# Patient Record
Sex: MALE | Race: WHITE | Employment: UNEMPLOYED | URBAN - METROPOLITAN AREA
[De-identification: names, ages, dates, MRNs, and addresses within clinical notes are randomized per-mention and may not be internally consistent; named-entity substitution may affect disease eponyms.]

---

## 2017-09-09 ENCOUNTER — HOSPITAL ENCOUNTER (EMERGENCY)
Age: 22
Discharge: HOME | End: 2017-09-09
Attending: EMERGENCY MEDICINE | Admitting: EMERGENCY MEDICINE
Payer: COMMERCIAL

## 2017-09-09 VITALS
OXYGEN SATURATION: 97 % | DIASTOLIC BLOOD PRESSURE: 55 MMHG | WEIGHT: 175.71 LBS | SYSTOLIC BLOOD PRESSURE: 117 MMHG | RESPIRATION RATE: 16 BRPM | HEART RATE: 58 BPM | TEMPERATURE: 97.9 F

## 2017-09-09 DIAGNOSIS — L08.9 SKIN INFECTION: Primary | ICD-10-CM

## 2017-09-09 PROCEDURE — 99282 EMERGENCY DEPT VISIT SF MDM: CPT

## 2017-09-09 RX ORDER — CEPHALEXIN 500 MG/1
500 CAPSULE ORAL 4 TIMES DAILY
Qty: 20 CAP | Refills: 0 | Status: SHIPPED | OUTPATIENT
Start: 2017-09-09 | End: 2017-09-14

## 2017-09-09 RX ORDER — BACITRACIN 500 [USP'U]/G
OINTMENT TOPICAL 3 TIMES DAILY
Qty: 1 TUBE | Refills: 0 | Status: SHIPPED | OUTPATIENT
Start: 2017-09-09 | End: 2017-09-19

## 2017-09-09 RX ORDER — SULFAMETHOXAZOLE AND TRIMETHOPRIM 800; 160 MG/1; MG/1
1 TABLET ORAL 2 TIMES DAILY
Qty: 5 TAB | Refills: 0 | Status: SHIPPED | OUTPATIENT
Start: 2017-09-09 | End: 2017-09-19

## 2017-09-09 NOTE — DISCHARGE INSTRUCTIONS
Cellulitis: After Your Visit to the Emergency Room  Your Care Instructions  Cellulitis is a skin infection that can spread to other tissues in the body. It can lead to a serious infection of the blood. It is important to follow instructions for home treatment and follow-up care so that you recover completely. Even though you have been released from the emergency room, you still need to watch for any problems. The doctor carefully checked you. But sometimes problems can develop later. If you have new symptoms, or if your symptoms do not get better, return to the emergency room or call your doctor right away. A visit to the emergency room is only one step in your treatment. Even if you feel better, you still need to do what your doctor recommends, such as going to all suggested follow-up appointments and taking medicines exactly as directed. This will help you recover and help prevent future problems. How can you care for yourself at home? · You may need antibiotics that you take through a tube in your arm (IV) at home. Your doctor will tell you how to use this medicine. · If you are taking antibiotic pills, take them as directed. Do not stop taking them just because you feel better. You need to take the full course of antibiotics. · Prop up the infected area on pillows to reduce pain and swelling. Try to keep the area above the level of your heart as much as you can. · Keep the skin clean and dry. Change your bandages as your doctor has told you. · Take pain medicines exactly as directed. ¨ If the doctor gave you a prescription medicine for pain, take it as prescribed. ¨ If you are not taking a prescription pain medicine, ask your doctor if you can take an over-the-counter medicine. · If your doctor told you to use support stockings, wear them as directed. This will help prevent swelling. · Get plenty of rest.  · Follow up with your doctor to make sure that the infection is gone.   When should you call for help? Call 911 if:  · You passed out (lost consciousness). · You have other symptoms that you think are a medical emergency. Return to the emergency room now if:  · You are dizzy or lightheaded, or you feel like you may faint. · You have signs that the infection is getting worse, such as:  ¨ Increased pain, swelling, warmth, or redness. ¨ New or increasing red streaks leading from the skin infection. ¨ New or increasing pus draining from the skin infection. ¨ A new or higher fever. Call your doctor today if:  · You have problems with your medicine. Where can you learn more? Go to GiveGab.be  Enter A262 in the search box to learn more about \"Cellulitis: After Your Visit to the Emergency Room. \"   © 9260-9001 [employer]. Care instructions adapted under license by [facility] [employer] [facility] (which disclaims liability or warranty for this information). This care instruction is for use with your licensed healthcare professional. If you have questions about a medical condition or this instruction, always ask your healthcare professional. [place] [address] any warranty or liability for your use of this information. Content Version: 9.4.97069;  Last Revised: October 24, 2011

## 2017-09-09 NOTE — ED TRIAGE NOTES
\"Rug burn\" on bilateral knees and now the right knee has an area of what appears to be a fluid filled blister. No medications PTA. No fever.

## 2017-09-09 NOTE — ED PROVIDER NOTES
Patient is a 23 y.o. male presenting with skin problem. Skin Problem           24y M here with a blister on the R knee. About a week ago he fell on a carpeted surface and got what he describes as \"rug burn\" to both knees and scabs had formed. Over the past few days he noticed a blister under the scab on the R knee. The area in general is itchy. There is some redness surrounding the area. It hurts more when he walks or bends his knee. No fever. No vomiting. No hx of similar problems. No other rash. History reviewed. No pertinent past medical history. History reviewed. No pertinent surgical history. History reviewed. No pertinent family history. Social History     Social History    Marital status: N/A     Spouse name: N/A    Number of children: N/A    Years of education: N/A     Occupational History    Not on file. Social History Main Topics    Smoking status: Current Every Day Smoker    Smokeless tobacco: Never Used    Alcohol use Not on file    Drug use: Not on file    Sexual activity: Not on file     Other Topics Concern    Not on file     Social History Narrative    No narrative on file         ALLERGIES: Review of patient's allergies indicates no known allergies. Review of Systems   Review of Systems   Constitutional: (-) weight loss. HEENT: (-) stiff neck   Eyes: (-) discharge. Respiratory: (-) for cough. Cardiovascular: (-) syncope. Gastrointestinal: (-) blood in stool. Genitourinary: (-) hematuria. Musculoskeletal: (-) myalgias. Neurological: (-) seizure. Skin: (-) petechiae  Lymph/Immunologic: (-) enlarged lymph nodes  All other systems reviewed and are negative. Vitals:    09/09/17 0939 09/09/17 0941   BP:  117/55   Pulse:  (!) 58   Resp:  16   Temp:  97.9 °F (36.6 °C)   SpO2:  97%   Weight: 79.7 kg (175 lb 11.3 oz)             Physical Exam Nursing note and vitals reviewed. Constitutional: oriented to person, place, and time.  appears well-developed and well-nourished. No distress. Head: Normocephalic and atraumatic. Nose: No rhinorrhea  Mouth/Throat: Oropharynx is clear and moist.  Eyes: Conjunctivae are normal.  Neck: Painless normal range of motion. Cardiovascular: Normal rate  Pulmonary/Chest:  No respiratory distress  Extremities/Musculoskeletal: Normal range of motion. No tenderness. No edema. Distal extremities are neurovasc intact. Scabs over both knees. R knee with a blister at the superior portion of the scab. It is not warm. Slightly tender to touch. Small area of redness surrounding the scab. Fluid in the blister appears clear to slightly red. Neurological:  Alert and oriented to person, place, and time. Coordination normal. CN 2-12 intact. Motor and sensory function intact. Skin: Skin is warm and dry. No rash noted. No pallor. MDM 24y M here with a blister under a scab. Looks like he may have an infection. Will treat with topical antibiotic ointment and oral abx. No signs of joint infection. Can move the knee freely.      ED Course       Procedures

## 2018-02-21 ENCOUNTER — HOSPITAL ENCOUNTER (EMERGENCY)
Age: 23
Discharge: HOME | End: 2018-02-22
Attending: EMERGENCY MEDICINE
Payer: COMMERCIAL

## 2018-02-21 DIAGNOSIS — F41.1 ANXIETY STATE: Primary | ICD-10-CM

## 2018-02-21 PROCEDURE — 90791 PSYCH DIAGNOSTIC EVALUATION: CPT

## 2018-02-21 PROCEDURE — 99283 EMERGENCY DEPT VISIT LOW MDM: CPT

## 2018-02-22 VITALS
HEART RATE: 66 BPM | DIASTOLIC BLOOD PRESSURE: 54 MMHG | HEIGHT: 74 IN | BODY MASS INDEX: 23.1 KG/M2 | OXYGEN SATURATION: 96 % | TEMPERATURE: 98.3 F | SYSTOLIC BLOOD PRESSURE: 95 MMHG | RESPIRATION RATE: 15 BRPM | WEIGHT: 180 LBS

## 2018-02-22 PROCEDURE — 74011250637 HC RX REV CODE- 250/637: Performed by: EMERGENCY MEDICINE

## 2018-02-22 RX ORDER — ALPRAZOLAM 0.5 MG/1
TABLET ORAL
Status: DISCONTINUED
Start: 2018-02-22 | End: 2018-02-22 | Stop reason: HOSPADM

## 2018-02-22 RX ORDER — ALPRAZOLAM 0.5 MG/1
0.5 TABLET ORAL
Status: COMPLETED | OUTPATIENT
Start: 2018-02-22 | End: 2018-02-22

## 2018-02-22 RX ORDER — ALPRAZOLAM 0.5 MG/1
0.5 TABLET ORAL
Qty: 8 TAB | Refills: 0 | Status: SHIPPED | OUTPATIENT
Start: 2018-02-22 | End: 2018-06-22

## 2018-02-22 RX ADMIN — ALPRAZOLAM 0.5 MG: 0.5 TABLET ORAL at 01:24

## 2018-02-22 NOTE — ED PROVIDER NOTES
Patient is a 23 y.o. male presenting with anxiety. Anxiety    The problem has been gradually worsening. Associated symptoms include nausea and vomiting. Pertinent negatives include no cough, no fever and no headaches. 23year old male with without significant pmhx presents with generalized anxiety- can't sleep, isn't eating, having a hard time getting to work. Worse over the past week. Has never seen anyone for it. Thinks his mom may have anxiety, no other mental health issues he knows of. Denies suicidal/homicdal thoughts. Does not feel he needs admission. History reviewed. No pertinent past medical history. History reviewed. No pertinent surgical history. History reviewed. No pertinent family history. Social History     Social History    Marital status: SINGLE     Spouse name: N/A    Number of children: N/A    Years of education: N/A     Occupational History    Not on file. Social History Main Topics    Smoking status: Current Every Day Smoker    Smokeless tobacco: Never Used    Alcohol use Not on file    Drug use: Not on file    Sexual activity: Not on file     Other Topics Concern    Not on file     Social History Narrative         ALLERGIES: Review of patient's allergies indicates no known allergies. Review of Systems   Constitutional: Negative for fever. HENT: Negative for congestion. Eyes: Negative for visual disturbance. Respiratory: Negative for cough. Gastrointestinal: Positive for nausea and vomiting. Negative for abdominal distention. Endocrine: Negative for polyuria. Genitourinary: Negative for dysuria. Musculoskeletal: Negative for gait problem. Skin: Negative for rash. Neurological: Negative for headaches. Psychiatric/Behavioral: Positive for dysphoric mood and sleep disturbance. Negative for hallucinations, self-injury and suicidal ideas. The patient is nervous/anxious.         Vitals:    02/21/18 2350   BP: 120/70   Pulse: 70   Resp: 16 Temp: 98.4 °F (36.9 °C)   SpO2: 97%   Weight: 81.6 kg (180 lb)   Height: 6' 2\" (1.88 m)            Physical Exam   Constitutional: He is oriented to person, place, and time. He appears well-developed and well-nourished. No distress. HENT:   Head: Normocephalic and atraumatic. Mouth/Throat: Oropharynx is clear and moist. No oropharyngeal exudate. Eyes: Conjunctivae and EOM are normal. Pupils are equal, round, and reactive to light. Right eye exhibits no discharge. Left eye exhibits no discharge. No scleral icterus. Neck: Normal range of motion. Neck supple. No JVD present. Cardiovascular: Normal rate, regular rhythm, normal heart sounds and intact distal pulses. Exam reveals no gallop and no friction rub. No murmur heard. Pulmonary/Chest: Effort normal and breath sounds normal. No stridor. No respiratory distress. He has no wheezes. He has no rales. He exhibits no tenderness. Abdominal: Soft. Bowel sounds are normal. He exhibits no distension and no mass. There is no tenderness. There is no rebound and no guarding. Musculoskeletal: Normal range of motion. He exhibits no edema or tenderness. Neurological: He is alert and oriented to person, place, and time. He has normal reflexes. No cranial nerve deficit. He exhibits normal muscle tone. Coordination normal.   Skin: Skin is warm and dry. No rash noted. No erythema. Psychiatric: He has a normal mood and affect. His behavior is normal. Judgment and thought content normal.        [facility]      ED Course       Procedures      [facility] eval- does not feel he needs admission and patient is not seeking admission. Patient asking for medications to help with his symptoms. Will given #8 0.5mg xanax and referral info for outpatient therapy. Will return if symptoms worsen.

## 2018-02-22 NOTE — DISCHARGE INSTRUCTIONS

## 2018-02-22 NOTE — ED TRIAGE NOTES
Patient presents to the emergency department reporting anxiety. Patient was seen by his PCP for the same and referred to psychiatry. Patient denies hallucinations, delusions, SI or HI. Patient states his anxiety has become unbearable.

## 2018-02-22 NOTE — BSMART NOTE
Patient is 23year old male reporting to ED Patient presents to the emergency department reporting anxiety. Patient was seen by his PCP for the same and referred to psychiatry. Patient denies hallucinations, delusions, SI or HI. Patient states his anxiety has become unbearable. At bedside, patient denied suicidal thoughts, homicidal and hallucinations. Patient denied specific stressors reported daily life but not to point out. Patient reported he lives with his mother and works in which both are going well. Patient reported that he smokes marijuana daily and it helps sometimes with his anxiety. Patient has not been previously hospitalized and has not seen as psychiatrist in the past. The patient was observed lying in bed with his girlfriend beside him and he appear calm and he was cooperative. Patient does not meet criteria for an admission and he is not seeking an admission at this time.  Patient will be given resources for psychiatrist.

## 2018-02-22 NOTE — ED NOTES
Pt discharged from ED with prescription and community resources. VSS. NAD. Pt ambulatory from ED with steady gait and  present for transportation.

## 2018-06-22 ENCOUNTER — APPOINTMENT (OUTPATIENT)
Dept: CT IMAGING | Age: 23
End: 2018-06-22
Attending: EMERGENCY MEDICINE
Payer: COMMERCIAL

## 2018-06-22 ENCOUNTER — HOSPITAL ENCOUNTER (EMERGENCY)
Age: 23
Discharge: HOME | End: 2018-06-22
Attending: PEDIATRICS
Payer: COMMERCIAL

## 2018-06-22 VITALS
SYSTOLIC BLOOD PRESSURE: 121 MMHG | DIASTOLIC BLOOD PRESSURE: 77 MMHG | BODY MASS INDEX: 22.39 KG/M2 | TEMPERATURE: 98.9 F | WEIGHT: 174.38 LBS | OXYGEN SATURATION: 100 % | HEART RATE: 73 BPM | RESPIRATION RATE: 18 BRPM

## 2018-06-22 DIAGNOSIS — S02.32XA CLOSED FRACTURE OF LEFT ORBITAL FLOOR, INITIAL ENCOUNTER (HCC): Primary | ICD-10-CM

## 2018-06-22 PROCEDURE — 74011250637 HC RX REV CODE- 250/637: Performed by: EMERGENCY MEDICINE

## 2018-06-22 PROCEDURE — 70486 CT MAXILLOFACIAL W/O DYE: CPT

## 2018-06-22 PROCEDURE — 74011000250 HC RX REV CODE- 250: Performed by: EMERGENCY MEDICINE

## 2018-06-22 PROCEDURE — 70450 CT HEAD/BRAIN W/O DYE: CPT

## 2018-06-22 PROCEDURE — 99284 EMERGENCY DEPT VISIT MOD MDM: CPT

## 2018-06-22 RX ORDER — ONDANSETRON 4 MG/1
4 TABLET, ORALLY DISINTEGRATING ORAL
Qty: 6 TAB | Refills: 0 | Status: ON HOLD | OUTPATIENT
Start: 2018-06-22 | End: 2020-02-25

## 2018-06-22 RX ORDER — AMOXICILLIN AND CLAVULANATE POTASSIUM 875; 125 MG/1; MG/1
1 TABLET, FILM COATED ORAL
Status: COMPLETED | OUTPATIENT
Start: 2018-06-22 | End: 2018-06-22

## 2018-06-22 RX ORDER — HYDROCODONE BITARTRATE AND ACETAMINOPHEN 5; 325 MG/1; MG/1
1 TABLET ORAL
Qty: 9 TAB | Refills: 0 | Status: ON HOLD | OUTPATIENT
Start: 2018-06-22 | End: 2020-02-25

## 2018-06-22 RX ORDER — ONDANSETRON 4 MG/1
4 TABLET, ORALLY DISINTEGRATING ORAL
Status: COMPLETED | OUTPATIENT
Start: 2018-06-22 | End: 2018-06-22

## 2018-06-22 RX ORDER — HYDROCODONE BITARTRATE AND ACETAMINOPHEN 5; 325 MG/1; MG/1
1 TABLET ORAL
Status: COMPLETED | OUTPATIENT
Start: 2018-06-22 | End: 2018-06-22

## 2018-06-22 RX ORDER — AMOXICILLIN AND CLAVULANATE POTASSIUM 875; 125 MG/1; MG/1
1 TABLET, FILM COATED ORAL 2 TIMES DAILY
Qty: 10 TAB | Refills: 0 | Status: SHIPPED | OUTPATIENT
Start: 2018-06-22 | End: 2018-06-27

## 2018-06-22 RX ADMIN — ONDANSETRON 4 MG: 4 TABLET, ORALLY DISINTEGRATING ORAL at 21:24

## 2018-06-22 RX ADMIN — HYDROCODONE BITARTRATE AND ACETAMINOPHEN 1 TABLET: 5; 325 TABLET ORAL at 22:04

## 2018-06-22 RX ADMIN — AMOXICILLIN AND CLAVULANATE POTASSIUM 1 TABLET: 875; 125 TABLET, FILM COATED ORAL at 22:32

## 2018-06-22 RX ADMIN — FLUORESCEIN SODIUM 1 STRIP: 0.6 STRIP OPHTHALMIC at 21:49

## 2018-06-22 NOTE — Clinical Note
Norco:1 pill every 6 hours as needed for pain. Be aware of sedating effects, no alcohol or driving with this medicine. Zofran:1 pill every 8 hours as needed for nausea. Augmentin:1 pill every 12 hours for 5 days. Keep head elevated for next 48 hours. Place ice in a bag and apply to eye for 20 minutes 2-3 times a day for next 48 hours. Return to ER for any redness, warmth, increased swelling, pain, vomiting,  fever over 101.

## 2018-06-23 NOTE — ED NOTES
Patient education given on Norco, potential side effects, and not to take any additional acetaminophen and the patient expresses understanding and acceptance of instructions.  Mj Sena RN 6/22/2018 10:18 PM

## 2018-06-23 NOTE — ED PROVIDER NOTES
HPI Comments: 61-year-old male presents to the emergency room for evaluation left eye injury. Patient was playing basketball yesterday with left eye. Patient reports swelling to the left eye and redness to the left eye. Yesterday patient was only open his eye. Today the swelling has gone down the patient is able to open it. No blurry vision or double vision. No foreign body sensation. No tearing. No photophobia. No pain with movement of the eye. There was no loss of consciousness. No neck pain. Patient reports intermittent nausea. No numbness/tingling of upper or lower extremity. He took Tylenol with no relief. No dizziness or lightheadedness. Patient complains of pain around eye as well as bruising. Patient was seen in urgent care. Patient had fluorescein exam done with no dye uptake or abrasions. Patient was discharged with Toradol and told to come to the emergency room for further evaluation of the bones. Pain rated 7/10. Social hx  +smoker  Immunization up to date    Patient is a 23 y.o. male presenting with eye pain. The history is provided by the patient. Eye Pain    Associated symptoms include eye redness, nausea and pain. Pertinent negatives include no numbness, no discharge, no photophobia, no vomiting, no weakness and no dizziness. History reviewed. No pertinent past medical history. History reviewed. No pertinent surgical history. History reviewed. No pertinent family history. Social History     Social History    Marital status: SINGLE     Spouse name: N/A    Number of children: N/A    Years of education: N/A     Occupational History    Not on file.      Social History Main Topics    Smoking status: Current Every Day Smoker    Smokeless tobacco: Never Used    Alcohol use Not on file    Drug use: Not on file    Sexual activity: Not on file     Other Topics Concern    Not on file     Social History Narrative         ALLERGIES: Review of patient's allergies indicates no known allergies. Review of Systems   Constitutional: Negative for chills. HENT: Negative for congestion and sore throat. Eyes: Positive for pain and redness. Negative for photophobia, discharge, itching and visual disturbance. Respiratory: Negative for shortness of breath. Cardiovascular: Negative for chest pain. Gastrointestinal: Positive for nausea. Negative for abdominal pain and vomiting. Musculoskeletal: Negative for back pain, neck pain and neck stiffness. Skin: Positive for color change (black eye). Negative for wound. Neurological: Negative for dizziness, weakness, light-headedness, numbness and headaches. Vitals:    06/22/18 2100   BP: 121/77   Pulse: 73   Resp: 18   Temp: 98.9 °F (37.2 °C)   SpO2: 100%   Weight: 79.1 kg (174 lb 6.1 oz)            Physical Exam   Constitutional: He appears well-developed and well-nourished. No distress. HENT:   Head: Normocephalic. Eyes: EOM are normal. Pupils are equal, round, and reactive to light. Right eye exhibits no chemosis, no discharge, no exudate and no hordeolum. No foreign body present in the right eye. Left eye exhibits no chemosis, no discharge, no exudate and no hordeolum. No foreign body present in the left eye. Right conjunctiva is not injected. Right conjunctiva has no hemorrhage. Left conjunctiva is not injected. Left conjunctiva has a hemorrhage. No scleral icterus. Right eye exhibits normal extraocular motion and no nystagmus. Left eye exhibits normal extraocular motion and no nystagmus. Right pupil is round and reactive. Left pupil is round. Pupils are equal.   Slit lamp exam:       The right eye shows no hyphema, no hypopyon and no anterior chamber bulge. The left eye shows no hyphema, no hypopyon and no anterior chamber bulge. Leftr Eye:  Pupil equal and reactive. EOM fully intact and painless.  Pt able to look up and down, left and right  Soft tissue swelling and ecchymosis to eyelids and around left eye.  Pt tender to palpation along orbit. No step of. .  no warmth. No redness  No dye uptake. No abrasions noted on woods lamp exam.  Anterior chamber,clear  No hyphema. No foreign body noted. No dye uptake. No corneal abrasios  Subconjunctival hemorrhage noted. Visual acuity corrected: L:20/30, Right:20/70. Neck: Normal range of motion. Neck supple. No midline tenderness to palpation of cspine   Cardiovascular: Normal rate and regular rhythm. Pulmonary/Chest: Effort normal and breath sounds normal. No respiratory distress. Musculoskeletal: Normal range of motion. Neurological: He is alert. He exhibits normal muscle tone. Coordination normal.   Skin: Skin is warm and dry. No rash noted. Psychiatric: He has a normal mood and affect. His behavior is normal. Judgment and thought content normal.   Nursing note and vitals reviewed. MDM  Number of Diagnoses or Management Options  Closed fracture of left orbital floor, initial encounter [facility]):   Diagnosis management comments: 73-year-old male presenting for left eye injury. Patient has ecchymosis and swelling around the left eye. The eye is open. There is no hyphema. His extraocular movements are intact fully. There is no pain with movement of his eyes. His anterior chamber is clear. There is a subconjunctival hemorrhage to the medial aspect of the eye. He has mild bony tenderness around the orbit. No midline tenderness to the spine. He is alert and oriented. Plan: Visual acuity, restain for abrasion, Zofran, CAT scan of the orbits and head. 10:10 PM.  Pt case including HPI, PE, and all available lab and radiology results has been discussed with ENT Dr. Micah Ma. He recommends antibiotics, follow-up with him or ophthalmology. 10:18 PM  Pt case including HPI, PE, and all available lab and radiology results has been discussed with Dr. Ed Quiles. He is in agreement with antibiotics.  He will see patient on Sunday at 1 pm at [place]. This has been discussed with patient. He is in agreement with plan. Standard narcotic and sedating medication warnings given  Patient's results have been reviewed with them. Patient and/or family have verbally conveyed their understanding and agreement of the patient's signs, symptoms, diagnosis, treatment and prognosis and additionally agree to follow up as recommended or return to the Emergency Room should their condition change prior to follow-up. Discharge instructions have also been provided to the patient with some educational information regarding their diagnosis as well a list of reasons why they would want to return to the ER prior to their follow-up appointment should their condition change. Amount and/or Complexity of Data Reviewed  Discuss the patient with other providers: yes (ER attending-[place])    Patient Progress  Patient progress: stable        ED Course       Procedures    Pt has been seen and evaluated by attending physician who has reviewed lab work and imaging tests. She agrees with discharge and prescription plan.

## 2018-06-23 NOTE — ED TRIAGE NOTES
Triage Note: elbowed to the left eye yesterday during basketball. Bloody nose as well. Today swollen shut. Now red at the inner scleral area, some watering but less swollen now. No other injury and some nausea still, but less, tolerating food.   Went to [facility] sent here for further evaluation

## 2018-06-23 NOTE — ED NOTES
Patient education given on eye care, head elevation, follow up on Sunday and the patient expresses understanding and acceptance of instructions.  Xiomara Hernandez RN 6/22/2018 10:38 PM

## 2018-06-23 NOTE — DISCHARGE INSTRUCTIONS
Facial Fracture: Care Instructions  Your Care Instructions  You have broken (fractured) one or more bones in your face. Swelling and bruising from the injury are likely to get worse over the first couple of days. After that, the swelling should steadily improve until it is gone. If you have bruises on your face, they may change as they heal. The skin may turn from black and blue to green to yellow or brown before it returns to its normal color. It may take several weeks for your injury to heal.  It is very important that you get follow-up care as directed so that the injury heals properly and does not lead to problems. The kind of care and treatment you will need depends on the specific type of break (or breaks) you have. You heal best when you take good care of yourself. Eat a variety of healthy foods, and don't smoke. Follow-up care is a key part of your treatment and safety. Be sure to make and go to all appointments, and call your doctor if you are having problems. It's also a good idea to know your test results and keep a list of the medicines you take. How can you care for yourself at home? · Put ice or a cold pack on your injury for 10 to 20 minutes at a time. Try to do this every 1 to 2 hours for the next 3 days (when you are awake) or until the swelling goes down. Put a thin cloth between the ice pack and your skin. · Go to all follow-up appointments with your doctor. Your doctor will determine whether you need further treatment, including surgery. · Take your medicines exactly as prescribed. Call your doctor if you think you are having a problem with your medicine. You will get more details on the specific medicines your doctor prescribes. · If your doctor prescribed antibiotics, take them exactly as directed. Do not stop taking them just because you feel better. You need to take the full course of antibiotics. · Be safe with medicines. Read and follow all instructions on the label.   ¨ If the doctor gave you a prescription medicine for pain, take it as prescribed. ¨ If you are not taking a prescription pain medicine, ask your doctor if you can take an over-the-counter medicine. · Keep your head elevated when you sleep. · Eat soft food to decrease jaw pain. · Do not blow your nose. Dab it with a tissue if you need to. When should you call for help? Call 911 anytime you think you may need emergency care. For example, call if:  ? · You have a seizure. ? · You passed out (lost consciousness). ? · You have tingling, weakness, or numbness on one side of your body. ?Call your doctor now or seek immediate medical care if:  ? · You have a severe headache. ? · You develop double vision. ? · You have a fever and stiff neck. ? · Clear, watery fluid drains from your nose. ? · You feel dizzy or lightheaded. ? · You have new eye pain or changes in your vision, such as blurring. ? · You have new ear pain, ringing in your ears, or trouble hearing. ? · You are confused, irritable, or not acting normally. ? · You have a hard time standing, walking, or talking. ? · You have new mouth or tooth pain, or you have trouble chewing. ? · You have increasing pain even after you have taken your pain medicine. ? Watch closely for changes in your health, and be sure to contact your doctor if:  ? · You develop a cough, cold, or sinus infection. ? · The symptoms from your injury are not steadily improving. Where can you learn more? Go to http://rigoberto-zoya.info/. Enter [id number] [id number] in the search box to learn more about \"Facial Fracture: Care Instructions. \"  Current as of: October 14, 2016  Content Version: 11.4  © 7431-8373 [employer]. Care instructions adapted under license by [employer] (which disclaims liability or warranty for this information).  If you have questions about a medical condition or this instruction, always ask your healthcare professional. [employer] disclaims any warranty or liability for your use of this information.

## 2020-02-25 ENCOUNTER — HOSPITAL ENCOUNTER (INPATIENT)
Age: 25
LOS: 2 days | Discharge: HOME | DRG: 751 | End: 2020-02-27
Attending: PSYCHIATRY & NEUROLOGY | Admitting: PSYCHIATRY & NEUROLOGY
Payer: MEDICAID

## 2020-02-25 PROBLEM — F39 UNSPECIFIED MOOD (AFFECTIVE) DISORDER (HCC): Status: ACTIVE | Noted: 2020-02-25

## 2020-02-25 PROCEDURE — 65220000003 HC RM SEMIPRIVATE PSYCH

## 2020-02-25 PROCEDURE — 74011250637 HC RX REV CODE- 250/637: Performed by: NURSE PRACTITIONER

## 2020-02-25 PROCEDURE — 74011250637 HC RX REV CODE- 250/637: Performed by: PSYCHIATRY & NEUROLOGY

## 2020-02-25 RX ORDER — ADHESIVE BANDAGE
30 BANDAGE TOPICAL DAILY PRN
Status: DISCONTINUED | OUTPATIENT
Start: 2020-02-25 | End: 2020-02-27 | Stop reason: HOSPADM

## 2020-02-25 RX ORDER — OLANZAPINE 5 MG/1
5 TABLET ORAL
Status: DISCONTINUED | OUTPATIENT
Start: 2020-02-25 | End: 2020-02-27 | Stop reason: HOSPADM

## 2020-02-25 RX ORDER — HYDROXYZINE 25 MG/1
50 TABLET, FILM COATED ORAL
Status: DISCONTINUED | OUTPATIENT
Start: 2020-02-25 | End: 2020-02-27 | Stop reason: HOSPADM

## 2020-02-25 RX ORDER — HALOPERIDOL 5 MG/ML
5 INJECTION INTRAMUSCULAR
Status: DISCONTINUED | OUTPATIENT
Start: 2020-02-25 | End: 2020-02-27 | Stop reason: HOSPADM

## 2020-02-25 RX ORDER — LORAZEPAM 2 MG/ML
1 INJECTION INTRAMUSCULAR
Status: DISCONTINUED | OUTPATIENT
Start: 2020-02-25 | End: 2020-02-27 | Stop reason: HOSPADM

## 2020-02-25 RX ORDER — ACETAMINOPHEN 325 MG/1
650 TABLET ORAL
Status: DISCONTINUED | OUTPATIENT
Start: 2020-02-25 | End: 2020-02-27 | Stop reason: HOSPADM

## 2020-02-25 RX ORDER — BENZTROPINE MESYLATE 1 MG/1
1 TABLET ORAL
Status: DISCONTINUED | OUTPATIENT
Start: 2020-02-25 | End: 2020-02-27 | Stop reason: HOSPADM

## 2020-02-25 RX ORDER — TRAZODONE HYDROCHLORIDE 50 MG/1
50 TABLET ORAL
Status: DISCONTINUED | OUTPATIENT
Start: 2020-02-25 | End: 2020-02-27 | Stop reason: HOSPADM

## 2020-02-25 RX ORDER — MIRTAZAPINE 15 MG/1
15 TABLET, FILM COATED ORAL
Status: DISCONTINUED | OUTPATIENT
Start: 2020-02-25 | End: 2020-02-27 | Stop reason: HOSPADM

## 2020-02-25 RX ORDER — DIPHENHYDRAMINE HYDROCHLORIDE 50 MG/ML
50 INJECTION, SOLUTION INTRAMUSCULAR; INTRAVENOUS
Status: DISCONTINUED | OUTPATIENT
Start: 2020-02-25 | End: 2020-02-27 | Stop reason: HOSPADM

## 2020-02-25 RX ADMIN — MIRTAZAPINE 15 MG: 15 TABLET, FILM COATED ORAL at 22:11

## 2020-02-25 RX ADMIN — TRAZODONE HYDROCHLORIDE 50 MG: 50 TABLET ORAL at 22:11

## 2020-02-25 RX ADMIN — HYDROXYZINE HYDROCHLORIDE 50 MG: 25 TABLET, FILM COATED ORAL at 13:32

## 2020-02-25 NOTE — PROGRESS NOTES
[facility] Admission Pharmacy Medication Reconciliation     Recommendations/Findings:   1) Denies being on any medications currently. Reports history of asthma as a child. Total Time Spent: 5 minutes    Information obtained from: Patient     Patient allergies:    Allergies as of 02/25/2020    (No Known Allergies)       Prior to admission medications:   None       Thank you,  SERGEY Forrest BCPS  Desk: [phone] ([id number])  Pharmacy: [phone] ([id number])

## 2020-02-25 NOTE — GROUP NOTE
JARED  GROUP DOCUMENTATION INDIVIDUAL Group Therapy Note Date: 2/25/2020 Group Start Time: 1400 Group End Time: 1500 Group Topic: Recreational/Music Therapy [facility] 3 ACUTE BEHAV [facility] [place], [address] GROUP DOCUMENTATION GROUP Group Therapy Note Attendees: 4 Attendance: Did not attend Patient's Goal: Interventions/techniques Kwadwo Arora

## 2020-02-25 NOTE — GROUP NOTE
JARED  GROUP DOCUMENTATION INDIVIDUAL Group Therapy Note Date: 2/25/2020 Group Start Time: 1000 Group End Time: 1100 Group Topic: Topic Group [address] 3 ACUTE BEHAV [facility] [place], [address] [facility] GROUP DOCUMENTATION GROUP Group Therapy Note Attendees: 7 Attendance: Did not attend Patient's Goal: Interventions/techniquesAmanda Sparks

## 2020-02-25 NOTE — BH NOTES
PSYCHOSOCIAL ASSESSMENT  :Patient identifying info:  Kenny Alvarenga is a 27 y.o., male admitted 2/25/2020  4:31 AM     Presenting problem and precipitating factors: Patient was transferred to [facility] from [facility] after his mother called 48 stating son had threatened to shoot her and himself. Pt also reportedly made statements to his child's mother about crashing his car. Upon assessment, Pt reported, \"I say these things that I don't mean. I just have a lot going on. \"  Pt reported symptoms of depression including poor sleep, loss of appetite, lack of motivation, and feelings of worthlessness and hopelessness. He denies SI/HI/AVH. Mental status assessment: Alert, oriented, calm, cooperative    Strengths: Stable housing; supportive family    Collateral information: Mohsen Melara (mother [phone])    Current psychiatric /substance abuse providers and contact info: To be linked    Previous psychiatric/substance abuse providers and response to treatment: 1 previous inpatient psychiatric hospitalization ([facility] for similar presentation)    Family history of mental illness or substance abuse: None indicated    Substance abuse history:  THC and ETOH  Social History     Tobacco Use    Smoking status: Current Every Day Smoker    Smokeless tobacco: Never Used   Substance Use Topics    Alcohol use: Not on file       History of biomedical complications associated with substance abuse: Paranoia    Patient's current acceptance of treatment or motivation for change: Fair    Family constellation: 1 child (age 4mos); mother    Is significant other involved? Yes, mother of child      Describe support system: Family    Describe living arrangements and home environment: Patient lives with family.     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

## 2020-02-25 NOTE — BH NOTES
0700-Patient report received from Colby Fernandez RN    0730-Patient denies SI/HI and AVH. Patient denies pain. Patient calm and cooperative. Patient appears well groomed. Patient resting in his room currently. 0830-Patient did not eat breakfast. He stated he was tired and not hungry. 1200-Patient continues to be isolative to his room. Patient came out of his room at lunch time to use the phone and was encouraged to eat, he stated he would just take some juice and snacks. 1332-Patient reports feeling anxious given prn atarax. 1500-Patient reports feeling better since being given prn atarax. 1533-Patient resting in his room. Patient did not eat lunch. He was provided snacks and some ginger ale.    1729-Patient ate dinner. Patient remains isolative to the dayroom.       1833-Patient visiting with family

## 2020-02-25 NOTE — BH NOTES
GROUP THERAPY PROGRESS NOTE    Patient did not participate in Specialty group.      Alem Dennison LPC LSATP [facility]

## 2020-02-25 NOTE — BH NOTES
GROUP THERAPY PROGRESS NOTE    Patient did not participate in Process  group.      Kimmie Kurtz LPC [facility]

## 2020-02-25 NOTE — BH NOTES
Patient arrived to the unit on a voluntary admission from [facility] with the chief complains of suicidal and homicidal threats, depressed mood, poor sleep and argument with his mother and girlfriend. Recent stressors includes job loss. Patient threatened to shoot his mother and himself and crash his car to his girlfriend. Patient stated that he has access to guns during the admission. Patient has a history of THC use. Patient denied S. I//A/V/H during the admission. Patient  stated that he is depressed and sad and he blames himself, stating, \"I have put myself into this position\". Patient was calm and cooperative. Patient appeared withdrawn and sad. Patient stated that he is homicidal towards unspecific people. No behaviors of self harm or  harmfulness to others observed. Skin assessment finiding was unremarkable. Admission orders were obtained from [address] [place] [address]. Patient was oriented to the unit. Will continue to monitor as per the  protocol.

## 2020-02-25 NOTE — PROGRESS NOTES
Problem: Depressed Mood (Adult/Pediatric)  Goal: *STG: Participates in treatment plan  Outcome: Progressing Towards Goal     Problem: Depressed Mood (Adult/Pediatric)  Goal: *STG: Remains safe in hospital  Outcome: Progressing Towards Goal

## 2020-02-26 PROCEDURE — 74011250637 HC RX REV CODE- 250/637: Performed by: NURSE PRACTITIONER

## 2020-02-26 PROCEDURE — 65220000003 HC RM SEMIPRIVATE PSYCH

## 2020-02-26 PROCEDURE — 74011250637 HC RX REV CODE- 250/637: Performed by: PSYCHIATRY & NEUROLOGY

## 2020-02-26 RX ADMIN — MIRTAZAPINE 15 MG: 15 TABLET, FILM COATED ORAL at 23:45

## 2020-02-26 RX ADMIN — HYDROXYZINE HYDROCHLORIDE 50 MG: 25 TABLET, FILM COATED ORAL at 21:58

## 2020-02-26 RX ADMIN — TRAZODONE HYDROCHLORIDE 50 MG: 50 TABLET ORAL at 21:58

## 2020-02-26 NOTE — BH NOTES
GROUP THERAPY PROGRESS NOTE    Patient did not participate in Discharge Group.      Angelia Murillo [facility]

## 2020-02-26 NOTE — INTERDISCIPLINARY ROUNDS
Behavioral Health Interdisciplinary Rounds Patient Name: Kiki Chen  Age: 27 y.o. Room/Bed:  326/02 Primary Diagnosis: Unspecified mood (affective) disorder (HCC) Admission Status: Voluntary Readmission within 30 days: no 
Power of  in place: no 
Patient requires a blocked bed: no          Reason for blocked bed: n/a Sleep hours: 8.75 Participation in Care/Groups:  no 
Medication Compliant?: Yes PRNS (last 24 hours): Sleep Aid Restraints (last 24 hours):  no 
Substance Abuse:  no   
24 hour chart check complete: yes Patient goal(s) for today: Continue taking medications as prescribed; engage in unit activities Treatment team focus/goals: Continue medication regimen; schedule follow-up Progress note: Pt presents with no complaints. He is future-oriented and states he is looking forward to seeing his child and getting back to work. Interested in outpatient counseling. LOS:  1  Expected LOS: 2-3 Financial concerns/prescription coverage: NICE Family contact:  None Family requesting physician contact today:  No 
Discharge plan: Return home Access to weapons:  Jose De Jesus [place] Outpatient provider(s): To be linked Patient's preferred phone number for follow up call: [phone] Participating treatment team members: Chalo Juárez MSW; Dr. Bahman Avila MD

## 2020-02-26 NOTE — PROGRESS NOTES
Problem: Depressed Mood (Adult/Pediatric)  Goal: *STG: Participates in treatment plan  Outcome: Progressing Towards Goal  Goal: *STG: Verbalizes anger, guilt, and other feelings in a constructive manor  Outcome: Progressing Towards Goal  Goal: *STG: Demonstrates reduction in symptoms and increase in insight into coping skills/future focused  Outcome: Progressing Towards Goal  Goal: *STG: Remains safe in hospital  Outcome: Progressing Towards Goal  Goal: *STG: Complies with medication therapy  Outcome: Progressing Towards Goal     Problem: Depressed Mood (Adult/Pediatric)  Goal: *STG: Attends activities and groups  Outcome: Not Progressing Towards Goal

## 2020-02-26 NOTE — H&P
[address] [facility] HISTORY AND PHYSICAL    Name:  Milton Workman  MR#:  [id number]  :  1995  ACCOUNT #:  [de-identified]  ADMIT DATE:  2020    PSYCHIATRIC INTAKE NOTE    CHIEF COMPLAINT:  \"Going through a lot of stuff right now. \"    HISTORY OF PRESENT ILLNESS:  This is a 73-year-old [employer] male with prior history for ADD, substance use, who states being depressed on and off for months now, having poor sleep, poor energy, falling and staying asleep  are an issue, helpless and hopeless at times. He has had thoughts of suicide, had one prior hospitalization for similar reasons, where he felt his mother called the  on him unnecessarily. He got into an argument with her and she was making statements that he wanted to harm himself. He stated if that was the case, \" we would both be dead. Ed Spies Ed Spies \" if he were there with her, he said it as an offhanded thing and not a statement of intent. Mother called the police on him and they came to his home, brought him in to get help. At the time when they got to him, he was not having any issues with behavior. He was not trying to harm himself and there was no fight or disagreements. He had an argument with his girlfriend as well. He also notes job loss and stressors from his relationship. Apparently, threatened to shoot his mother and crash his car into his girlfriend is what they stated, but he denies that. He uses marijuana. He acknowledges that he is depressed and he put himself in this position otherwise with unnecessary statements and so he is calm with the treatment. PAST PSYCHIATRIC HISTORY:  Depression, prior treatments, one prior hospitalization, marijuana use. PAST MEDICAL HISTORY:  Denies , maybe he had asthma when he was younger he says, but he has not had any acute exacerbations, used to use a nebulizer. ALLERGIES:  NO KNOWN DRUG ALLERGIES. SOCIAL HISTORY:  He is single, one child.   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 He acknowledges a history of substance use and going in the wrong direction in the streets but he smokes about pack a day of cigarettes, marijuana on occasion, and alcohol on occasion, but demonstrates interest in life and over thinking on turning his life around. No aggression or violence here. MENTAL STATUS EXAM:  Young adult male, calm and cooperative. Clear, coherent speech of average rate, volume, and tone. Mood is fine. Affect fair range. Thoughts are linear and goal directed. Denies suicidal or homicidal ideations. No auditory or visual hallucinations. Aware of his surroundings, location and situation. Here for management of his condition. DIAGNOSES:  Mood disorder, not otherwise specified, rule out major depressive disorder versus adjustment disorder, mixed features. PLAN:  Admit for safety and stabilization, medication modification as needed, group therapy, individual therapy. ESTIMATED LENGTH OF STAY:  5-7 days. DISPOSITION:  Planning with Social Work. STRENGTHS:  Willingness for treatment. DISABILITIES:  Drug use history. ELDER Lopez MD      [id number]  D:  02/25/2020 17:37  T:  02/26/2020 0:32  JOB #:  [id number]

## 2020-02-26 NOTE — BH NOTES
[id number] Assumed care of patient    2130- Patient lying in bed, he denies SI/HI/AVH, He stated he's been in the bed all day.  No other information provided by patient as he wanted to go back to sleep    2211- Provided patient with scheduled medication; along with PRN trazodone    Patient has been resting with no issues to report    Hourly Rounds completed, patient slept approx 8.75

## 2020-02-26 NOTE — GROUP NOTE
JARED  GROUP DOCUMENTATION INDIVIDUAL Group Therapy Note Date: 2/26/2020 Group Start Time: 1500 Group End Time: 5821 Group Topic: Recreational/Music Therapy [facility] [place] [address] ACUTE BEHAV [facility] [place], [address] GROUP DOCUMENTATION GROUP Group Therapy Note Attendees: 8 Attendance: Attended Patient's Goal:  To concentrate on selected task Interventions/techniques: Supported-crafts,games,music Follows Directions: Followed directions Interactions: Interacted appropriately Mental Status: Calm Behavior/appearance: Attentive and Cooperative Goals Achieved: Able to engage in interactions and Able to listen to others-completed task Additional Notes:   
 
[address]

## 2020-02-26 NOTE — BH NOTES
GROUP THERAPY PROGRESS NOTE    Patient is participating in Coping Skills group. Group time: 45 minutes    Personal goal for participation: To come up with a plan of things to help maintain wellness of the whole person    Goal orientation: Personal    Group therapy participation: active    Therapeutic interventions reviewed and discussed: Group discussion of the wellness wheel and how they plan to balance themselves by looking at spiritual, physical, occupations, intellectual, social/family, and emotional aspects of their lives. 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     Tu Murguia LPC [facility]

## 2020-02-26 NOTE — BH NOTES
0700 Received report from night shift nurse. [id number] Pt ate meals in the day room but did not attend morning groups. Pt denies all psych symptoms but has flat, sad affect. [id number] Pt attended afternoon groups and ate dinner in the day room. Pt interacted well with peers. No acute distress. Continue to monitor and provide support when needed.

## 2020-02-27 VITALS
HEIGHT: 74 IN | DIASTOLIC BLOOD PRESSURE: 65 MMHG | RESPIRATION RATE: 18 BRPM | BODY MASS INDEX: 26.44 KG/M2 | HEART RATE: 67 BPM | WEIGHT: 206 LBS | TEMPERATURE: 97.5 F | OXYGEN SATURATION: 100 % | SYSTOLIC BLOOD PRESSURE: 109 MMHG

## 2020-02-27 RX ORDER — MIRTAZAPINE 15 MG/1
15 TABLET, FILM COATED ORAL
Qty: 30 TAB | Refills: 0 | Status: SHIPPED | OUTPATIENT
Start: 2020-02-27

## 2020-02-27 NOTE — DISCHARGE INSTRUCTIONS
DISCHARGE SUMMARY    Chayo Phillips  : 1995  MRN: [id number]    The patient Kacie Jalloh exhibits the ability to control behavior in a less restrictive environment. Patient's level of functioning is improving. No assaultive/destructive behavior has been observed for the past 24 hours. No suicidal/homicidal threat or behavior has been observed for the past 24 hours. There is no evidence of serious medication side effects. Patient has not been in physical or protective restraints for at least the past 24 hours. If weapons involved, how are they secured? Patient does not have possession of firearms. Is patient aware of and in agreement with discharge plan? Yes    Arrangements for medication:  Prescriptions given to patient. Copy of discharge instructions to provider?:  Dr. Tayla Bruno for transportation home:  Girlfriend to . Keep all follow up appointments as scheduled, continue to take prescribed medications per physician instructions. Mental health crisis number:  558 or your local mental health crisis line number at [phone]. Eddie Tolbert If I feel I am at risk of hurting myself or others, I will call the crisis office and speak with a crisis worker who will assist me during my crisis. Eddie Tolbert .Nomi Hampton   Suzy [address] [phone]  [address] [place] [address]-  [phone]

## 2020-02-27 NOTE — BH NOTES
Pt is pleasant and cooperative with staff and peers. Pt is discharge focused. Pt is med compliant and denies all symptoms. Pt attends some groups and eats meals in the day room.

## 2020-02-27 NOTE — INTERDISCIPLINARY ROUNDS
Behavioral Health Interdisciplinary Rounds Patient Name: Serenawilian Dave  Age: 27 y.o. Room/Bed:  326/02 Primary Diagnosis: Unspecified mood (affective) disorder (HCC) Admission Status: Voluntary Readmission within 30 days:  
Power of  in place:  
Patient requires a blocked bed: no          Reason for blocked bed:  
 
Sleep hours: 8 Participation in Care/Groups:  yes Medication Compliant?: Yes PRNS (last 24 hours): Antianxiety and Sleep Aid Restraints (last 24 hours):  no 
Substance Abuse:  no   
24 hour chart check complete: yes Patient goal(s) for today:  
Treatment team focus/goals:  
Progress note: LOS:  2  Expected LOS:  
 
Financial concerns/prescription coverage:   
Family contact:      
Family requesting physician contact today:   
Discharge plan: Access to weapons: Outpatient provider(s):  
Patient's preferred phone number for follow up call:  
 
Participating treatment team members: Park Dave, (assigned SW),

## 2020-02-27 NOTE — BH NOTES
GROUP THERAPY PROGRESS NOTE    Patient is participating in Process Group. Group time: 45 minutes    Personal goal for participation: Ask him or herself questions regarding what was accomplished today. Goal orientation: Personal    Group therapy participation: active    Therapeutic interventions reviewed and discussed: Group discussion was focused 7 questions that individuals should ask themselves each day to determine what they accomplished or made progress on each day and the benefits of doing this. Impression of participation: Collin Armstrong shared his thoughts on getting things accomplished and ways that he can do this.     Tu Murguia LPC [facility]

## 2020-02-27 NOTE — BH NOTES
Behavioral Health Transition Record to Provider    Patient Name: Gerhardt Hard  YOB: 1995  Medical Record Number: [id number]  Date of Admission: 2/25/2020  Date of Discharge: 2/27/2020    Attending Provider: Mar Erickson MD  Discharging Provider: Mar Erickson MD  To contact this individual call [phone] and ask the  to page. If unavailable, ask to be transferred to [address] [place] [address] on call. [facility] Provider will be available on call 24/7 and during holidays. Primary Care Provider: Ludmila Ledbetter MD    No Known Allergies    Reason for Admission: This is a 82-year-old [facility] American male with prior history for ADD, substance use, who states being depressed on and off for months now, having poor sleep, poor energy, falling and staying asleep  are an issue, helpless and hopeless at times. He has had thoughts of suicide, had one prior hospitalization for similar reasons, where he felt his mother called the  on him unnecessarily. He got into an argument with her and she was making statements that he wanted to harm himself. He stated if that was the case, \" we would both be dead. Davis Chowdary \" if he were there with her, he said it as an offhanded thing and not a statement of intent. Mother called the police on him and they came to his home, brought him in to get help. At the time when they got to him, he was not having any issues with behavior. He was not trying to harm himself and there was no fight or disagreements. He had an argument with his girlfriend as well. He also notes job loss and stressors from his relationship. Apparently, threatened to shoot his mother and crash his car into his girlfriend is what they stated, but he denies that. He uses marijuana. He acknowledges that he is depressed and he put himself in this position otherwise with unnecessary statements and so he is calm with the treatment.     Admission Diagnosis: Unspecified mood (affective) disorder (HCC) [F39]    * No surgery found *    No results found for this or any previous visit. Immunizations administered during this encounter: There is no immunization history on file for this patient. Screening for Metabolic Disorders for Patients on Antipsychotic Medications  (Data obtained from the EMR)    Estimated Body Mass Index  Estimated body mass index is 26.45 kg/m² as calculated from the following:    Height as of this encounter: 6' 2\" (1.88 m). Weight as of this encounter: 93.4 kg (206 lb). Vital Signs/Blood Pressure  Visit Vitals  /65   Pulse 67   Temp 97.5 °F (36.4 °C)   Resp 18   Ht 6' 2\" (1.88 m)   Wt 93.4 kg (206 lb)   SpO2 100%   BMI 26.45 kg/m²       Blood Glucose/Hemoglobin A1c  No results found for: GLU, GLUCPOC    No results found for: HBA1C, HGBE8, RLF3ZBGQ     Lipid Panel  No results found for: CHOL, CHOLX, CHLST, CHOLV, 485186, HDL, HDLP, LDL, LDLC, DLDLP, TGLX, TRIGL, TRIGP, CHHD, CHHDX     Discharge Diagnosis: Unspecified mood disorder (ICD-10-CM: F39)    Discharge Plan: Patient discharged home into the care of his girlfriend. DISCHARGE SUMMARY    Tamara Figueroa  : 1995  MRN: [id number]    The patient Reeda Gottron exhibits the ability to control behavior in a less restrictive environment. Patient's level of functioning is improving. No assaultive/destructive behavior has been observed for the past 24 hours. No suicidal/homicidal threat or behavior has been observed for the past 24 hours. There is no evidence of serious medication side effects. Patient has not been in physical or protective restraints for at least the past 24 hours. If weapons involved, how are they secured? Patient does not have possession of firearms. Is patient aware of and in agreement with discharge plan? Yes    Arrangements for medication:  Prescriptions given to patient.      Copy of discharge instructions to provider?:   Tennis    Arrangements for transportation home:  Girlfriend to . Keep all follow up appointments as scheduled, continue to take prescribed medications per physician instructions. Mental health crisis number:  [id number] or your local mental health crisis line number at [phone]. Discharge Medication List and Instructions:   Discharge Medication List as of 2/27/2020  2:12 PM      START taking these medications    Details   mirtazapine (REMERON) 15 mg tablet Take 1 Tab by mouth nightly. Indications: major depressive disorder, Print, Disp-30 Tab, R-0             Unresulted Labs (24h ago, onward)    None        To obtain results of studies pending at discharge, please contact [phone]    Follow-up Information     Follow up With Specialties Details Why Contact Info    Tennis, Merna Pallas, MD Pediatrics Schedule an appointment as soon as possible for a visit Hospital discharge follow-up; medication management. [address] [address]  Call If you have aged out of your current primary care provider, please call [facility] to schedule an appointment for primary care and medication management. [facility], [address]   Call to schedule an appointment for individual therapy. [facility]  [address], [address] [facility]  [place], [address]  [phone]          Advanced Directive:   Does the patient have an appointed surrogate decision maker? No  Does the patient have a Medical Advance Directive? No  Does the patient have a Psychiatric Advance Directive?  No  If the patient does not have a surrogate or Medical Advance Directive AND Psychiatric Advance Directive, the patient was offered information on these advance directives Yes and Patient declined to complete    Patient Instructions: Please continue all medications until otherwise directed by physician. Tobacco Cessation Discharge Plan:   Is the patient a smoker and needs referral for smoking cessation? Yes  Patient referred to the following for smoking cessation with an appointment? Refused     Patient was offered medication to assist with smoking cessation at discharge? Refused  Was education for smoking cessation added to the discharge instructions? Yes    Alcohol/Substance Abuse Discharge Plan:   Does the patient have a history of substance/alcohol abuse and requires a referral for treatment? Yes  Patient referred to the following for substance/alcohol abuse treatment with an appointment? Yes, Patient referred to call and set up appointment with Nelia Lau. Patient was offered medication to assist with alcohol cessation at discharge? Refused  Was education for substance/alcohol abuse added to discharge instructions? Yes    Patient discharged to Home; discussed with patient/caregiver and provided to the patient/caregiver either in hard copy or electronically.

## 2020-02-27 NOTE — PROGRESS NOTES
Problem: Depressed Mood (Adult/Pediatric)  Goal: *STG: Remains safe in hospital  Outcome: Progressing Towards Goal     Problem: Depressed Mood (Adult/Pediatric)  Goal: *STG: Complies with medication therapy  Outcome: Progressing Towards Goal

## 2020-02-27 NOTE — PROGRESS NOTES
Problem: Depressed Mood (Adult/Pediatric)  Goal: *STG: Participates in treatment plan  2/27/2020 1317 by Merry Kendrick  Outcome: Progressing Towards Goal  2/27/2020 1042 by Merry Kendrick  Outcome: Progressing Towards Goal  Goal: *STG: Verbalizes anger, guilt, and other feelings in a constructive manor  2/27/2020 1317 by Merry Kendrick  Outcome: Progressing Towards Goal  2/27/2020 1042 by Merry Kendrick  Outcome: Progressing Towards Goal  Goal: *STG: Attends activities and groups  2/27/2020 1317 by Merry Kendrick  Outcome: Progressing Towards Goal  2/27/2020 1042 by Merry Kendrick  Outcome: Not Progressing Towards Goal  Goal: *STG: Demonstrates reduction in symptoms and increase in insight into coping skills/future focused  Outcome: Progressing Towards Goal  Goal: *STG: Remains safe in hospital  2/27/2020 1317 by Merry Kendrick  Outcome: Progressing Towards Goal  2/27/2020 1042 by Merry Kendrick  Outcome: Progressing Towards Goal  Goal: *STG: Complies with medication therapy  2/27/2020 1317 by Merry Kendrick  Outcome: Progressing Towards Goal  2/27/2020 1042 by Merry Kendrick  Outcome: Progressing Towards Goal

## 2020-02-27 NOTE — BH NOTES
Psychiatric Progress Note    Patient: Kelly Chavez MRN: [id number]  SSN: [id number]    YOB: 1995  Age: 27 y.o. Sex: male      Admit Date: 2/25/2020    LOS: 1 day     Subjective:     Kelly Chavez  reports feeling alright today and moods are good. Denies SI/HI/AH/VH. No aggression or violence. Appropriately interactive and aware. Tolerating medications well. Eating well and sleeping well.     Objective:     Vitals:    02/25/20 0445 02/25/20 0758 02/25/20 2002 02/26/20 0802   BP: 106/69 113/72 122/65 108/60   Pulse: (!) 58 91 62 80   Resp: 16 16 17 16   Temp: 97.7 °F (36.5 °C) 97.9 °F (36.6 °C) 98.5 °F (36.9 °C) 97.6 °F (36.4 °C)   SpO2: 100% 99% 99% 100%   Weight: 93.4 kg (206 lb)      Height: 6' 2\" (1.88 m)           Mental Status Exam:   Sensorium  oriented to time, place and person   Relations cooperative   Eye Contact appropriate   Appearance:  age appropriate and tattooed   Speech:  normal volume and non-pressured   Thought Process: goal directed   Thought Content free of delusions and free of hallucinations   Suicidal ideations none   Mood:  stable   Affect:  constricted   Memory   adequate   Concentration:  adequate   Insight:  fair   Judgment:  fair       MEDICATIONS:  Current Facility-Administered Medications   Medication Dose Route Frequency    OLANZapine (ZyPREXA) tablet 5 mg  5 mg Oral Q6H PRN    haloperidol lactate (HALDOL) injection 5 mg  5 mg IntraMUSCular Q6H PRN    benztropine (COGENTIN) tablet 1 mg  1 mg Oral BID PRN    diphenhydrAMINE (BENADRYL) injection 50 mg  50 mg IntraMUSCular BID PRN    hydroxyzine HCL (ATARAX) tablet 50 mg  50 mg Oral TID PRN    LORazepam (ATIVAN) injection 1 mg  1 mg IntraMUSCular Q4H PRN    traZODone (DESYREL) tablet 50 mg  50 mg Oral QHS PRN    acetaminophen (TYLENOL) tablet 650 mg  650 mg Oral Q4H PRN    magnesium hydroxide (MILK OF MAGNESIA) 400 mg/5 mL oral suspension 30 mL  30 mL Oral DAILY PRN    mirtazapine (REMERON) tablet 15 mg  15 mg Oral QHS      DISCUSSION:   the risks and benefits of the proposed medication  patient given opportunity to ask questions    Lab/Data Review: All lab results for the last 24 hours reviewed.      No major concerns    Assessment:     Principal Problem:    Unspecified mood (affective) disorder ([facility] [address] (2/25/2020)        Plan:     Continue current care  Collateral information  Disposition planning with social work for the next few days    Signed By: Jessy Salinas MD     February 26, 2020
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
## 2020-02-27 NOTE — PROGRESS NOTES
Problem: Depressed Mood (Adult/Pediatric)  Goal: *STG: Participates in treatment plan  Outcome: Progressing Towards Goal  Goal: *STG: Verbalizes anger, guilt, and other feelings in a constructive manor  Outcome: Progressing Towards Goal  Goal: *STG: Remains safe in hospital  Outcome: Progressing Towards Goal  Goal: *STG: Complies with medication therapy  Outcome: Progressing Towards Goal     Problem: Depressed Mood (Adult/Pediatric)  Goal: *STG: Attends activities and groups  Outcome: Not Progressing Towards Goal

## 2020-02-27 NOTE — BH NOTES
Patient without complaint this shift. He has been isolative to his room. Medication compliant. Observed in bed resting quietly with eyes closed. No distress noted. Patient slept approx 8 hours.
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
